# Patient Record
Sex: FEMALE | Race: WHITE | NOT HISPANIC OR LATINO | Employment: OTHER | URBAN - METROPOLITAN AREA
[De-identification: names, ages, dates, MRNs, and addresses within clinical notes are randomized per-mention and may not be internally consistent; named-entity substitution may affect disease eponyms.]

---

## 2017-01-04 ENCOUNTER — RADIANT APPOINTMENT (OUTPATIENT)
Dept: GENERAL RADIOLOGY | Facility: CLINIC | Age: 22
End: 2017-01-04
Attending: FAMILY MEDICINE
Payer: COMMERCIAL

## 2017-01-04 ENCOUNTER — OFFICE VISIT (OUTPATIENT)
Dept: URGENT CARE | Facility: URGENT CARE | Age: 22
End: 2017-01-04
Payer: COMMERCIAL

## 2017-01-04 VITALS
TEMPERATURE: 99.1 F | HEART RATE: 98 BPM | OXYGEN SATURATION: 97 % | SYSTOLIC BLOOD PRESSURE: 124 MMHG | DIASTOLIC BLOOD PRESSURE: 80 MMHG | RESPIRATION RATE: 30 BRPM

## 2017-01-04 DIAGNOSIS — R06.1 STRIDOR: ICD-10-CM

## 2017-01-04 DIAGNOSIS — J05.11 ACUTE EPIGLOTTITIS WITH AIRWAY OBSTRUCTION: ICD-10-CM

## 2017-01-04 DIAGNOSIS — R06.1 STRIDOR: Primary | ICD-10-CM

## 2017-01-04 PROCEDURE — 71020 XR CHEST 2 VW: CPT

## 2017-01-04 PROCEDURE — 94640 AIRWAY INHALATION TREATMENT: CPT | Performed by: FAMILY MEDICINE

## 2017-01-04 PROCEDURE — 70360 X-RAY EXAM OF NECK: CPT

## 2017-01-04 PROCEDURE — 99203 OFFICE O/P NEW LOW 30 MIN: CPT | Mod: 25 | Performed by: FAMILY MEDICINE

## 2017-01-04 PROCEDURE — 96372 THER/PROPH/DIAG INJ SC/IM: CPT | Mod: 59 | Performed by: FAMILY MEDICINE

## 2017-01-04 RX ORDER — IPRATROPIUM BROMIDE AND ALBUTEROL SULFATE 2.5; .5 MG/3ML; MG/3ML
1 SOLUTION RESPIRATORY (INHALATION) ONCE
Qty: 3 ML | Refills: 0
Start: 2017-01-04 | End: 2019-03-20

## 2017-01-04 RX ORDER — METHYLPREDNISOLONE SODIUM SUCCINATE 125 MG/2ML
125 INJECTION, POWDER, LYOPHILIZED, FOR SOLUTION INTRAMUSCULAR; INTRAVENOUS ONCE
Qty: 2 ML | Refills: 0 | OUTPATIENT
Start: 2017-01-04 | End: 2017-01-04

## 2017-01-05 NOTE — NURSING NOTE
Chief Complaint   Patient presents with     Urgent Care     URI     sick with fever on Monday, coughing and congestion. Started having some trouble breathing about 7:30.        Initial /80 mmHg  Pulse 98  Temp(Src) 99.1  F (37.3  C) (Oral)  Resp 30  SpO2 97%  LMP 12/28/2016  Breastfeeding? No There is no height or weight on file to calculate BMI.  BP completed using cuff size: regular

## 2017-01-05 NOTE — PROGRESS NOTES
SUBJECTIVE:  Chief Complaint   Patient presents with     Urgent Care     URI     sick with fever on Monday, coughing and congestion. Started having some trouble breathing about 7:30.      Joe Kan is a 21 year old female who presents to the clinic today with viral URI for 2 days and a chief complaint of a stridor  with shortness of breath. for 2 hours(s).  Patient denies central chest pain., pleuritic chest pain and wheezing.  Symptoms started after taking a bath 2 hours ago     The patient's symptoms are severe and not changing over the course of time  She has retractions at the neck with attempting to inhale.  Associated symptoms include none.     Patient has been using nothing  to improve symptoms.    No past medical history on file.    ALLERGIES:  Amoxicillin; Penicillins; and Tylenol w/codeine      No current outpatient prescriptions on file prior to visit.  No current facility-administered medications on file prior to visit.    Social History   Substance Use Topics     Smoking status: Current Every Day Smoker     Smokeless tobacco: Never Used     Alcohol Use: Not on file       No family history on file.      ROS  INTEGUMENTARY/SKIN: NEGATIVE for worrisome rashes, moles or lesions  EYES: NEGATIVE for vision changes or irritation  GI: NEGATIVE for nausea, abdominal pain, heartburn, or change in bowel habits    OBJECTIVE:  /80 mmHg  Pulse 98  Temp(Src) 99.1  F (37.3  C) (Oral)  Resp 30  SpO2 97%  LMP 12/28/2016  Breastfeeding? No  GENERAL APPEARANCE: alert, severe distress and significant effort to inhale  EYES: EOMI,  PERRL, conjunctiva clear  Mouth- no swelling of tongue or lips or face  NECK: supple, nontender, no lymphadenopathy-  Audible stridor  Retractions in supraclavicular area with inspiration  RESP: lungs clear to auscultation - no rales, rhonchi or wheezes  CV: regular rates and rhythm, normal S1 S2, no murmur noted  NEURO: Normal strength and tone, sensory exam grossly normal,   normal speech and mentation  SKIN: no suspicious lesions or rashes    Chest X-ray:  No infiltrate  Lateral neck x-ray-  Swelling of the epiglottis.  Narrow airway at level of the trachea    ASSESSMENT:     Stridor     - ipratropium - albuterol 0.5 mg/2.5 mg/3 mL (DUONEB) 0.5-2.5 (3) MG/3ML neb solution; Take 1 vial (3 mLs) by nebulization once for 1 dose  - METHYLPREDNISOLONE INJ / 125MG  - methylPREDNISolone sodium succinate (SOLU-MEDROL) 125 mg/2 mL injection; Inject 2 mLs (125 mg) into the muscle once for 1 dose  - DiphenhydrAMINE HCl, Sleep, 25 MG CAPS; Take 25 mg by mouth once for 1 dose  - XR Neck Soft Tissue; Future  - XR Chest 2 Views; Future  - INJECTION INTRAMUSCULAR OR SUB-Q    After treatment she demonstrated no improvement in her respiratory effort    Acute epiglottitis with airway obstruction      since she did not respond to initial therapy for allergic type airway obstruction, transport to ER was arranged-  Initially arranged to go to [facility], but when ambulance arrived they felt she should go to [place] since she has [facility]

## 2018-09-18 ENCOUNTER — RECORDS - HEALTHEAST (OUTPATIENT)
Dept: LAB | Facility: CLINIC | Age: 23
End: 2018-09-18

## 2018-09-18 LAB — TSH SERPL DL<=0.005 MIU/L-ACNC: 1.02 UIU/ML (ref 0.3–5)

## 2018-09-19 ENCOUNTER — RECORDS - HEALTHEAST (OUTPATIENT)
Dept: ADMINISTRATIVE | Facility: OTHER | Age: 23
End: 2018-09-19

## 2018-09-21 LAB
BKR LAB AP ABNORMAL BLEEDING: NO
BKR LAB AP BIRTH CONTROL/HORMONES: NORMAL
BKR LAB AP CERVICAL APPEARANCE: NORMAL
BKR LAB AP GYN ADEQUACY: NORMAL
BKR LAB AP GYN INTERPRETATION: NORMAL
BKR LAB AP HPV REFLEX: NORMAL
BKR LAB AP LMP: NORMAL
BKR LAB AP PATIENT STATUS: NORMAL
BKR LAB AP PREVIOUS ABNORMAL: NORMAL
BKR LAB AP PREVIOUS NORMAL: 2014
HIGH RISK?: NO
PATH REPORT.COMMENTS IMP SPEC: NORMAL
RESULT FLAG (HE HISTORICAL CONVERSION): NORMAL

## 2019-03-20 ENCOUNTER — OFFICE VISIT (OUTPATIENT)
Dept: URGENT CARE | Facility: URGENT CARE | Age: 24
End: 2019-03-20

## 2019-03-20 VITALS
BODY MASS INDEX: 18.33 KG/M2 | WEIGHT: 110 LBS | HEART RATE: 84 BPM | RESPIRATION RATE: 15 BRPM | TEMPERATURE: 97.8 F | SYSTOLIC BLOOD PRESSURE: 100 MMHG | HEIGHT: 65 IN | DIASTOLIC BLOOD PRESSURE: 68 MMHG

## 2019-03-20 DIAGNOSIS — S05.01XA CORNEAL ABRASION, RIGHT, INITIAL ENCOUNTER: ICD-10-CM

## 2019-03-20 DIAGNOSIS — T15.91XA FOREIGN BODY OF RIGHT EYE, INITIAL ENCOUNTER: Primary | ICD-10-CM

## 2019-03-20 PROCEDURE — 99213 OFFICE O/P EST LOW 20 MIN: CPT | Performed by: NURSE PRACTITIONER

## 2019-03-20 ASSESSMENT — ENCOUNTER SYMPTOMS
NEUROLOGICAL NEGATIVE: 1
CONSTITUTIONAL NEGATIVE: 1
EYE DISCHARGE: 0
EYE REDNESS: 1
PHOTOPHOBIA: 0
EYE PAIN: 1
CARDIOVASCULAR NEGATIVE: 1
RESPIRATORY NEGATIVE: 1
DOUBLE VISION: 0
BLURRED VISION: 0

## 2019-03-20 ASSESSMENT — MIFFLIN-ST. JEOR: SCORE: 1239.84

## 2019-03-21 NOTE — PATIENT INSTRUCTIONS
Patient Education     Corneal Abrasion    You have received a scratch or scrape (abrasion) to your cornea. The cornea is the clear part in the front of the eye. This sensitive area is very painful when injured. You may make tears frequently, and your vision may be blurry until the injury heals. You may be sensitive to light.  This part of the body heals quickly. You can expect the pain to go away within 24 to 48 hours. If the abrasion is large or deep, your doctor may apply an eye patch, although this is not always done. An antibiotic ointment or eye drops may also be used to prevent infection.  Numbing drops may be used to relieve the pain temporarily so that your eyes can be examined. But these drops can t be prescribed for home use because that would prevent healing and lead to more serious problems. Also, if you can t feel your eye, there is a chance of accidentally injuring it further without knowing it.  Home care    A cold pack may be applied over the eye (or eye patch) for 20 minutes at a time, to reduce pain. To make a cold pack, put ice cubes in a plastic bag that seals at the top. Wrap the bag in a clean, thin towel or cloth.    You may use acetaminophen or ibuprofen to control pain, unless another pain medicine was prescribed. Note: If you have chronic liver or kidney disease or have ever had a stomach ulcer or GI bleeding, talk with your doctor before using these medicines.    Rest your eyes and don t read until symptoms are gone.    If you use contact lenses, don t wear them until all symptoms are gone.    If your vision is affected by the corneal abrasion or if an eye patch was applied, don t drive a motor vehicle or operate machinery until all symptoms are gone. You may have trouble judging distances using only one eye.    If your eyes are sensitive to light, try wearing sunglasses, or stay indoors until symptoms go away.  Follow-up care  Follow up with your healthcare provider, or as  advised.    If no patch was put on your eye and the pain continues for more than 48 hours, you should have another exam. Contact your healthcare provider to arrange this.    If your eye was patched and you were asked to remove the patch yourself, see your healthcare provider. Contact your healthcare provider if you still have pain after the patch is removed.    If you were given a return appointment for patch removal and re-examination, be sure to keep the appointment. Leaving the patch in place longer than advised could be harmful.  When to seek medical advice  Call your healthcare provider right away if any of these occur.    Increasing eye pain or pain that does not improve after 24 hours    Discharge from the eye    Increasing redness of the eye or swelling of the eyelids    Worsening vision    Symptoms get worse after the abrasion has healed  Date Last Reviewed: 7/1/2017 2000-2018 [employer]. [address] [place] [id number]. All rights reserved. This information is not intended as a substitute for professional medical care. Always follow your healthcare professional's instructions.

## 2019-03-21 NOTE — PROGRESS NOTES
"HPI  Joe Kan 24 year old with foreign body of the right eye. She believes it is dust. Tried to flush the eye but symptoms worsened. Pain w/o vision changes or discharge.     No past medical history on file.   There is no problem list on file for this patient.    Allergies   Allergen Reactions     Amoxicillin      Penicillins      Tylenol W/Codeine [Acetaminophen-Codeine]      Current Outpatient Medications   Medication     etonogestrel (IMPLANON/NEXPLANON) 68 MG IMPL     ipratropium - albuterol 0.5 mg/2.5 mg/3 mL (DUONEB) 0.5-2.5 (3) MG/3ML neb solution     No current facility-administered medications for this visit.          Review of Systems   Constitutional: Negative.    HENT: Negative.    Eyes: Positive for pain and redness. Negative for blurred vision, double vision, photophobia and discharge.   Respiratory: Negative.    Cardiovascular: Negative.    Neurological: Negative.          Physical Exam   Constitutional: She is oriented to person, place, and time. She appears well-developed and well-nourished. She appears distressed.   /68   Pulse 84   Temp 97.8  F (36.6  C) (Oral)   Resp 15   Ht 1.651 m (5' 5\")   Wt 49.9 kg (110 lb)   LMP 01/20/2019   BMI 18.30 kg/m       HENT:   Head: Normocephalic.   Eyes: EOM are normal. Pupils are equal, round, and reactive to light. Foreign body present in the right eye. Right conjunctiva is injected.   Slit lamp exam:       The right eye shows fluorescein uptake.       Lid enverted and a fabric fiber removed (might be a pet hair). Fluorescein stain shows 2 corneal abrasions on the right eye one at 7:00 and 11:00.   Cardiovascular: Normal rate.   Pulmonary/Chest: Effort normal.   Neurological: She is alert and oriented to person, place, and time.   Skin: Skin is warm and dry. No rash noted. No erythema.   Nursing note and vitals reviewed.    Assessment:  1. Foreign body of right eye, initial encounter    2. Corneal abrasion, right, initial encounter  "       Plan:  Orders Placed This Encounter     etonogestrel (IMPLANON/NEXPLANON) 68 MG IMPL   EES ointment instilled in the eye and patient given the remainder of the tube  And advised to place  it in the eye q 3 hours while awake for 3-4 days  Instructions regarding self-care of patent/child reviewed.   Written instructions provided in after visit summary and reviewed.  Patient instructed to see primary care provider for new or persistent symptoms.   Red flag symptoms reviewed and patient has been instructed to seek emergent   Care at the closest emergency room for evaluation and treatment.   Please contact pharmacy for medication questions.  Patient instructed to take medications as directed on package.      Venus Grossman, APRN, CNP

## 2020-02-29 ENCOUNTER — OFFICE VISIT (OUTPATIENT)
Dept: URGENT CARE | Facility: URGENT CARE | Age: 25
End: 2020-02-29
Payer: COMMERCIAL

## 2020-02-29 VITALS
DIASTOLIC BLOOD PRESSURE: 69 MMHG | HEIGHT: 65 IN | SYSTOLIC BLOOD PRESSURE: 109 MMHG | WEIGHT: 110 LBS | OXYGEN SATURATION: 99 % | HEART RATE: 69 BPM | TEMPERATURE: 98 F | BODY MASS INDEX: 18.33 KG/M2

## 2020-02-29 DIAGNOSIS — S05.01XA ABRASION OF RIGHT CORNEA, INITIAL ENCOUNTER: Primary | ICD-10-CM

## 2020-02-29 PROCEDURE — 99213 OFFICE O/P EST LOW 20 MIN: CPT | Performed by: FAMILY MEDICINE

## 2020-02-29 ASSESSMENT — MIFFLIN-ST. JEOR: SCORE: 1234.84

## 2020-03-01 NOTE — PROGRESS NOTES
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    Objective: Initially unable to hold eye open.  I put in apathetic and she was able to open it and I could not do a good exam.  I looked carefully for a foreign body in particular a small here but did not find 1.  I flipped the eyelid over and could not find it.  I then put in fluorescein dye and with the black light at about 10:00 there was an irregular patch.  Looking more closely at it I could not see any foreign body.    Assessment and plan: Corneal abrasion.  I do not see anything still in the eye.  I recommend she go home, take Tylenol and ibuprofen and if it is a simple abrasion it should be better in the morning.  If not she probably needs to be seen in the emergency room where they can use the slit lamp to see it better.

## 2020-03-10 ENCOUNTER — HEALTH MAINTENANCE LETTER (OUTPATIENT)
Age: 25
End: 2020-03-10

## 2020-12-27 ENCOUNTER — HEALTH MAINTENANCE LETTER (OUTPATIENT)
Age: 25
End: 2020-12-27

## 2021-04-24 ENCOUNTER — HEALTH MAINTENANCE LETTER (OUTPATIENT)
Age: 26
End: 2021-04-24

## 2021-05-31 ENCOUNTER — RECORDS - HEALTHEAST (OUTPATIENT)
Dept: ADMINISTRATIVE | Facility: CLINIC | Age: 26
End: 2021-05-31

## 2021-06-01 ENCOUNTER — RECORDS - HEALTHEAST (OUTPATIENT)
Dept: ADMINISTRATIVE | Facility: CLINIC | Age: 26
End: 2021-06-01

## 2021-06-02 ENCOUNTER — RECORDS - HEALTHEAST (OUTPATIENT)
Dept: ADMINISTRATIVE | Facility: CLINIC | Age: 26
End: 2021-06-02

## 2021-10-09 ENCOUNTER — HEALTH MAINTENANCE LETTER (OUTPATIENT)
Age: 26
End: 2021-10-09

## 2022-01-24 ENCOUNTER — LAB REQUISITION (OUTPATIENT)
Dept: LAB | Facility: CLINIC | Age: 27
End: 2022-01-24

## 2022-01-24 DIAGNOSIS — Z01.419 ENCOUNTER FOR GYNECOLOGICAL EXAMINATION (GENERAL) (ROUTINE) WITHOUT ABNORMAL FINDINGS: ICD-10-CM

## 2022-01-24 PROCEDURE — G0123 SCREEN CERV/VAG THIN LAYER: HCPCS | Performed by: FAMILY MEDICINE

## 2022-01-26 LAB
BKR LAB AP GYN ADEQUACY: NORMAL
BKR LAB AP GYN INTERPRETATION: NORMAL
BKR LAB AP HPV REFLEX: NORMAL
BKR LAB AP LMP: NORMAL
BKR LAB AP PREVIOUS ABNORMAL: NORMAL
PATH REPORT.COMMENTS IMP SPEC: NORMAL
PATH REPORT.COMMENTS IMP SPEC: NORMAL
PATH REPORT.RELEVANT HX SPEC: NORMAL

## 2022-05-16 ENCOUNTER — HEALTH MAINTENANCE LETTER (OUTPATIENT)
Age: 27
End: 2022-05-16

## 2022-09-11 ENCOUNTER — HEALTH MAINTENANCE LETTER (OUTPATIENT)
Age: 27
End: 2022-09-11

## 2023-06-03 ENCOUNTER — HEALTH MAINTENANCE LETTER (OUTPATIENT)
Age: 28
End: 2023-06-03

## 2023-07-17 ENCOUNTER — LAB REQUISITION (OUTPATIENT)
Dept: LAB | Facility: CLINIC | Age: 28
End: 2023-07-17
Payer: MEDICAID

## 2023-07-17 ENCOUNTER — PATIENT OUTREACH (OUTPATIENT)
Dept: CARE COORDINATION | Facility: CLINIC | Age: 28
End: 2023-07-17

## 2023-07-17 DIAGNOSIS — Q62.8 OTHER CONGENITAL MALFORMATIONS OF URETER: ICD-10-CM

## 2023-07-17 PROCEDURE — 87086 URINE CULTURE/COLONY COUNT: CPT | Mod: ORL | Performed by: FAMILY MEDICINE

## 2023-07-19 LAB — BACTERIA UR CULT: NO GROWTH

## 2023-08-21 ENCOUNTER — PATIENT OUTREACH (OUTPATIENT)
Dept: CARE COORDINATION | Facility: CLINIC | Age: 28
End: 2023-08-21
Payer: MEDICAID

## 2023-08-21 ASSESSMENT — ACTIVITIES OF DAILY LIVING (ADL): DEPENDENT_IADLS:: INDEPENDENT

## 2023-08-21 NOTE — PROGRESS NOTES
Clinic Care Coordination Contact  [facility] [place]/Voicemail       Clinical Data: Care Coordinator Outreach  Outreach attempted x 1.  Left message on patient's voicemail with call back information and requested return call.  Plan:  Care Coordinator will try to reach patient again in 1 month.    Shell Holguin [facility]  Social Work Care Coordinator - [facility]  Care Coordination  [email or web address]  Cell Phone: [phone]  Gender pronouns: she/her  Employed by [employer]

## 2023-08-21 NOTE — PROGRESS NOTES
Clinic Care Coordination Contact  Clinic Care Coordination Contact  OUTREACH    Referral Information:  Referral Source: PCP    Primary Diagnosis: Psychosocial    Chief Complaint   Patient presents with    Clinic Care Coordination - Face To Face        Universal Utilization:   Clinic Utilization: [facility]   Difficulty keeping appointments:: No  Compliance Concerns: No  No-Show Concerns: No  No PCP office visit in Past Year: No  Utilization      Hospital Admissions  0             ED Visits  0             No Show Count (past year)  0                    Current as of: 8/19/2023  2:09 PM                Clinical Concerns:  Current Medical Concerns:  n/a    Current Behavioral Concerns:     LEWIS MARROQUIN met with the pt and her mom in clinic. Pt was asking for assistance with getting mental health resources/supports set up. Pt was open to ideas of different  therapists. LEWIS CC and pt discussed [employer] and her ability to look at what the provider may look like and read about their background to see if there is someone she felt may be more appropriate for her and her needs.     Noted there was mention of need for a psych evaluation to be done as well, first step is getting established with therapists.       Education Provided to patient: LOPEZ SAUCEDO called and spoke with patient; introduced self, discussed role of Care Coordination, and explained reason for call.     Pain  Pain (GOAL):: No  Health Maintenance Reviewed: Up to date  Clinical Pathway: None    Medication Management:  Medication review status: did not discuss     Functional Status:  Dependent ADLs:: Independent  Dependent IADLs:: Independent  Bed or wheelchair confined:: No  Mobility Status: Independent  Fallen 2 or more times in the past year?: No  Any fall with injury in the past year?: No    Living Situation:  Current living arrangement:: I live in a private home with family  Type of residence:: Apartment    Lifestyle & Psychosocial  Needs:    Social Determinants of Health     Tobacco Use: High Risk (7/8/2021)    Patient History     Smoking Tobacco Use: Every Day     Smokeless Tobacco Use: Never     Passive Exposure: Not on file   Alcohol Use: Not on file   Financial Resource Strain: Not on file   Food Insecurity: Not on file   Transportation Needs: Not on file   Physical Activity: Not on file   Stress: Not on file   Social Connections: Not on file   Intimate Partner Violence: Not on file   Depression: Not on file   Housing Stability: Not on file     Diet:: Regular  Inadequate nutrition (GOAL):: No  Tube Feeding: No  Inadequate activity/exercise (GOAL):: No  Significant changes in sleep pattern (GOAL): No  Transportation means:: Accessible car     Temple or spiritual beliefs that impact treatment:: No  Mental health DX:: Yes  Mental health management concern (GOAL):: Yes  Chemical Dependency Status: Current Concern  Informal Support system:: Family, Children        Resources and Interventions:  Current Resources:      Community Resources: None  Supplies Currently Used at Home: None  Equipment Currently Used at Home: none  Employment Status: employed full-time         Advance Care Plan/Directive  Advanced Care Plans/Directives on file:: No  Advanced Care Plan/Directive Status: Not Applicable    Referrals Placed: Mental Health    The patient consented via Verbal consent to have contact information and resources sent via text in an unencrypted manner.     Care Plan:  Care Plan: Mental Health SW Only       Problem: Mental Health Symptoms Need Improvement       Goal: Improve management of mental health symptoms and establish with mental health/psychosocial supports       Start Date: 7/17/2023 Expected End Date: 11/17/2023    Note:     Barriers: availability and eligibility   Strengths: strong family support   Patient expressed understanding of goal: yes   Action steps to achieve this goal:  1. I will review the therapists and mental health  supports shared with me via text from [facility].   2. I will connect with a therapist/provider, establish services and follow up on recommendations.   3. I will contact the  CC with any questions or concerns.                                 Patient/Caregiver understanding: Patient verbalized understanding, engaged in AIDET communication during patient encounter.      Outreach Frequency: monthly      Plan: Pt to follow up on therapists resources and establish with a provider.  CC will follow up with the pt in 1 month. Pt to contact the [facility] with any questions or concerns.     Shell Holguin [facility]  Social Work Care Coordinator - [facility]  Care Coordination  [email or web address]  Cell Phone: [phone]  Gender pronouns: she/her  Employed by [employer]

## 2023-09-16 ENCOUNTER — HOSPITAL ENCOUNTER (EMERGENCY)
Facility: HOSPITAL | Age: 28
Discharge: HOME | End: 2023-09-16
Attending: STUDENT IN AN ORGANIZED HEALTH CARE EDUCATION/TRAINING PROGRAM | Admitting: STUDENT IN AN ORGANIZED HEALTH CARE EDUCATION/TRAINING PROGRAM
Payer: COMMERCIAL

## 2023-09-16 ENCOUNTER — APPOINTMENT (OUTPATIENT)
Dept: CT IMAGING | Facility: HOSPITAL | Age: 28
End: 2023-09-16
Attending: STUDENT IN AN ORGANIZED HEALTH CARE EDUCATION/TRAINING PROGRAM
Payer: COMMERCIAL

## 2023-09-16 VITALS
HEART RATE: 119 BPM | DIASTOLIC BLOOD PRESSURE: 60 MMHG | TEMPERATURE: 98.1 F | RESPIRATION RATE: 38 BRPM | BODY MASS INDEX: 22.7 KG/M2 | OXYGEN SATURATION: 97 % | SYSTOLIC BLOOD PRESSURE: 101 MMHG | WEIGHT: 136.24 LBS | HEIGHT: 65 IN

## 2023-09-16 DIAGNOSIS — F32.A DEPRESSION, UNSPECIFIED DEPRESSION TYPE: ICD-10-CM

## 2023-09-16 DIAGNOSIS — E86.0 DEHYDRATION: ICD-10-CM

## 2023-09-16 DIAGNOSIS — R11.2 NAUSEA AND VOMITING, UNSPECIFIED VOMITING TYPE: ICD-10-CM

## 2023-09-16 LAB
ALBUMIN SERPL BCG-MCNC: 5.3 G/DL (ref 3.5–5.2)
ALBUMIN UR-MCNC: 100 MG/DL
ALP SERPL-CCNC: 61 U/L (ref 35–104)
ALT SERPL W P-5'-P-CCNC: 49 U/L (ref 0–50)
ANION GAP SERPL CALCULATED.3IONS-SCNC: 27 MMOL/L (ref 7–15)
APPEARANCE UR: CLEAR
AST SERPL W P-5'-P-CCNC: 100 U/L (ref 0–45)
BASOPHILS # BLD AUTO: 0 10E3/UL (ref 0–0.2)
BASOPHILS NFR BLD AUTO: 1 %
BILIRUB DIRECT SERPL-MCNC: <0.2 MG/DL (ref 0–0.3)
BILIRUB SERPL-MCNC: 0.5 MG/DL
BILIRUB UR QL STRIP: NEGATIVE
BUN SERPL-MCNC: 9.6 MG/DL (ref 6–20)
CALCIUM SERPL-MCNC: 9.6 MG/DL (ref 8.6–10)
CHLORIDE SERPL-SCNC: 97 MMOL/L (ref 98–107)
COLOR UR AUTO: ABNORMAL
CREAT SERPL-MCNC: 0.57 MG/DL (ref 0.51–0.95)
DEPRECATED HCO3 PLAS-SCNC: 15 MMOL/L (ref 22–29)
EGFRCR SERPLBLD CKD-EPI 2021: >90 ML/MIN/1.73M2
EOSINOPHIL # BLD AUTO: 0 10E3/UL (ref 0–0.7)
EOSINOPHIL NFR BLD AUTO: 0 %
ERYTHROCYTE [DISTWIDTH] IN BLOOD BY AUTOMATED COUNT: 12.1 % (ref 10–15)
GLUCOSE SERPL-MCNC: 57 MG/DL (ref 70–99)
GLUCOSE UR STRIP-MCNC: NEGATIVE MG/DL
HCG UR QL: NEGATIVE
HCT VFR BLD AUTO: 38.7 % (ref 35–47)
HGB BLD-MCNC: 12.9 G/DL (ref 11.7–15.7)
HGB UR QL STRIP: NEGATIVE
HOLD SPECIMEN: NORMAL
IMM GRANULOCYTES # BLD: 0 10E3/UL
IMM GRANULOCYTES NFR BLD: 0 %
KETONES UR STRIP-MCNC: >150 MG/DL
LEUKOCYTE ESTERASE UR QL STRIP: ABNORMAL
LIPASE SERPL-CCNC: 36 U/L (ref 13–60)
LYMPHOCYTES # BLD AUTO: 1.2 10E3/UL (ref 0.8–5.3)
LYMPHOCYTES NFR BLD AUTO: 20 %
MCH RBC QN AUTO: 33.6 PG (ref 26.5–33)
MCHC RBC AUTO-ENTMCNC: 33.3 G/DL (ref 31.5–36.5)
MCV RBC AUTO: 101 FL (ref 78–100)
MONOCYTES # BLD AUTO: 0.5 10E3/UL (ref 0–1.3)
MONOCYTES NFR BLD AUTO: 8 %
MUCOUS THREADS #/AREA URNS LPF: PRESENT /LPF
NEUTROPHILS # BLD AUTO: 4.3 10E3/UL (ref 1.6–8.3)
NEUTROPHILS NFR BLD AUTO: 71 %
NITRATE UR QL: NEGATIVE
NRBC # BLD AUTO: 0 10E3/UL
NRBC BLD AUTO-RTO: 0 /100
PH UR STRIP: 5.5 [PH] (ref 5–7)
PLATELET # BLD AUTO: 217 10E3/UL (ref 150–450)
POTASSIUM SERPL-SCNC: 3.4 MMOL/L (ref 3.4–5.3)
PROT SERPL-MCNC: 8.2 G/DL (ref 6.4–8.3)
RBC # BLD AUTO: 3.84 10E6/UL (ref 3.8–5.2)
RBC URINE: 1 /HPF
SODIUM SERPL-SCNC: 139 MMOL/L (ref 136–145)
SP GR UR STRIP: 1.03 (ref 1–1.03)
SQUAMOUS EPITHELIAL: 2 /HPF
UROBILINOGEN UR STRIP-MCNC: <2 MG/DL
WBC # BLD AUTO: 6.1 10E3/UL (ref 4–11)
WBC URINE: 7 /HPF

## 2023-09-16 PROCEDURE — 96375 TX/PRO/DX INJ NEW DRUG ADDON: CPT

## 2023-09-16 PROCEDURE — 99285 EMERGENCY DEPT VISIT HI MDM: CPT | Mod: 25

## 2023-09-16 PROCEDURE — 96374 THER/PROPH/DIAG INJ IV PUSH: CPT | Mod: 59

## 2023-09-16 PROCEDURE — 85025 COMPLETE CBC W/AUTO DIFF WBC: CPT | Performed by: STUDENT IN AN ORGANIZED HEALTH CARE EDUCATION/TRAINING PROGRAM

## 2023-09-16 PROCEDURE — 83690 ASSAY OF LIPASE: CPT | Performed by: STUDENT IN AN ORGANIZED HEALTH CARE EDUCATION/TRAINING PROGRAM

## 2023-09-16 PROCEDURE — 96361 HYDRATE IV INFUSION ADD-ON: CPT

## 2023-09-16 PROCEDURE — 250N000011 HC RX IP 250 OP 636: Mod: JZ | Performed by: STUDENT IN AN ORGANIZED HEALTH CARE EDUCATION/TRAINING PROGRAM

## 2023-09-16 PROCEDURE — 250N000013 HC RX MED GY IP 250 OP 250 PS 637: Performed by: EMERGENCY MEDICINE

## 2023-09-16 PROCEDURE — 81025 URINE PREGNANCY TEST: CPT | Performed by: STUDENT IN AN ORGANIZED HEALTH CARE EDUCATION/TRAINING PROGRAM

## 2023-09-16 PROCEDURE — 82248 BILIRUBIN DIRECT: CPT | Performed by: STUDENT IN AN ORGANIZED HEALTH CARE EDUCATION/TRAINING PROGRAM

## 2023-09-16 PROCEDURE — 81001 URINALYSIS AUTO W/SCOPE: CPT | Performed by: STUDENT IN AN ORGANIZED HEALTH CARE EDUCATION/TRAINING PROGRAM

## 2023-09-16 PROCEDURE — 36415 COLL VENOUS BLD VENIPUNCTURE: CPT | Performed by: EMERGENCY MEDICINE

## 2023-09-16 PROCEDURE — 250N000011 HC RX IP 250 OP 636: Performed by: EMERGENCY MEDICINE

## 2023-09-16 PROCEDURE — 74177 CT ABD & PELVIS W/CONTRAST: CPT

## 2023-09-16 PROCEDURE — 258N000003 HC RX IP 258 OP 636: Performed by: STUDENT IN AN ORGANIZED HEALTH CARE EDUCATION/TRAINING PROGRAM

## 2023-09-16 RX ORDER — ONDANSETRON 4 MG/1
4 TABLET, ORALLY DISINTEGRATING ORAL EVERY 8 HOURS PRN
Qty: 21 TABLET | Refills: 0 | Status: SHIPPED | OUTPATIENT
Start: 2023-09-16 | End: 2023-09-23

## 2023-09-16 RX ORDER — DIPHENHYDRAMINE HYDROCHLORIDE 50 MG/ML
25 INJECTION INTRAMUSCULAR; INTRAVENOUS ONCE
Status: COMPLETED | OUTPATIENT
Start: 2023-09-16 | End: 2023-09-16

## 2023-09-16 RX ORDER — DIAZEPAM 5 MG
5 TABLET ORAL ONCE
Status: COMPLETED | OUTPATIENT
Start: 2023-09-16 | End: 2023-09-16

## 2023-09-16 RX ORDER — DROPERIDOL 2.5 MG/ML
1.25 INJECTION, SOLUTION INTRAMUSCULAR; INTRAVENOUS ONCE
Status: COMPLETED | OUTPATIENT
Start: 2023-09-16 | End: 2023-09-16

## 2023-09-16 RX ORDER — IOPAMIDOL 755 MG/ML
90 INJECTION, SOLUTION INTRAVASCULAR ONCE
Status: COMPLETED | OUTPATIENT
Start: 2023-09-16 | End: 2023-09-16

## 2023-09-16 RX ADMIN — SODIUM CHLORIDE, POTASSIUM CHLORIDE, SODIUM LACTATE AND CALCIUM CHLORIDE 1000 ML: 600; 310; 30; 20 INJECTION, SOLUTION INTRAVENOUS at 12:15

## 2023-09-16 RX ADMIN — IOPAMIDOL 90 ML: 755 INJECTION, SOLUTION INTRAVENOUS at 14:33

## 2023-09-16 RX ADMIN — DIPHENHYDRAMINE HYDROCHLORIDE 25 MG: 50 INJECTION, SOLUTION INTRAMUSCULAR; INTRAVENOUS at 12:14

## 2023-09-16 RX ADMIN — DROPERIDOL 1.25 MG: 2.5 INJECTION, SOLUTION INTRAMUSCULAR; INTRAVENOUS at 12:14

## 2023-09-16 RX ADMIN — DIAZEPAM 5 MG: 5 TABLET ORAL at 16:18

## 2023-09-16 ASSESSMENT — ACTIVITIES OF DAILY LIVING (ADL)
ADLS_ACUITY_SCORE: 35

## 2023-09-16 NOTE — ED NOTES
Progress Note    The patient was signed out to me by Dr. Chandra Pierce at 2:32 PM.    Brief HPI: Patient has had intermittent episodes of nausea for the last several months. Last several days has been unable to eat due to this and notes having left flank pain which she has a history of. Of note she has had passive suicidal thoughts which is not new.     ED Course as of 09/16/23 1508   Sat Sep 16, 2023   1507 Signout:  29 yo F with months of nausea. CT scan and PO challenge pending. chronic depression without active SI and good follow  up. she has dispo set up.    CT:  1.  No significant findings on CT abdomen and pelvis to explain the patient's pain.  2.  Moderate diffuse hepatic steatosis.       3:08 PM I spoke to patient about plan for discharge.     4:15 PM he is able to keep food and drink down.  She is somewhat tremulous.  I asked her mother to leave and we discussed her symptoms.  She states that she does normally drink little bit of alcohol but she has been trying to quit.  She stopped drinking 2 days ago and had 1 shot yesterday to help her symptoms.  I told her that a lot of her symptoms today are likely due to the withdrawal.  I offered a dose of Valium before she left and she would like to do this.  I let her know that if things are getting worse at home she needs to come back for further treatment.  She understands.    Final diagnoses:   Nausea and vomiting, unspecified vomiting type   Dehydration   Depression, unspecified depression type   Alcohol withdrawal      Ander Dexter MD  Emergency Medicine  [facility]      Ander Dexter MD  09/16/23 [id number]

## 2023-09-16 NOTE — ED NOTES
"She has had thoughts of harming herself in recent days due to her sense \"I cannot do anything right.\" She does not have a plan. She does take medications. She has been talking to her provider at [facility] about getting a mental health provider. 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  "

## 2023-09-16 NOTE — Clinical Note
Joe Kan was seen and treated in our emergency department on 9/16/2023.  She may return to work on 09/19/2023.       If you have any questions or concerns, please don't hesitate to call.      Chandra Pierce MD

## 2023-09-16 NOTE — DISCHARGE INSTRUCTIONS
The cause of your symptoms is still unknown, as your work-up was reassuring.  We have provided you with Zofran to help ease your nausea in the coming days.  You should follow-up with your primary care provider on Monday to discuss your ongoing nausea in addition to discussing your mental health concerns and possibly restarting your medications.    Please return to the emergency department if you are unable to tolerate any fluids despite the Zofran.  Return if you develop worsening suicidal thoughts, plans to harm yourself or others.

## 2023-09-16 NOTE — ED PROVIDER NOTES
"EMERGENCY DEPARTMENT ENCOUNTER      NAME: Joe Kan  AGE: 28 year old female  YOB: 1995  MRN: [id number]  EVALUATION DATE & TIME: 9/16/2023 10:08 AM    PCP: Anny Juares    ED PROVIDER: Chandra Pierce MD      Chief Complaint   Patient presents with    Nausea       FINAL IMPRESSION:  1. Nausea and vomiting, unspecified vomiting type    2. Dehydration    3. Depression, unspecified depression type        ED COURSE & MEDICAL DECISION MAKING:    Pertinent Labs & Imaging studies reviewed. (See chart for details)  28 year old female presents to the Emergency Department for evaluation of nausea/vomiting, depression, dehydration    10:55 AM I met with the patient, obtained history, performed an initial exam, and discussed options and plan for diagnostics and treatment here in the ED.  12:41 PM Reevaluated the patient. Updated on results and plan.    ED Course as of 09/16/23 1444   Sat Sep 16, 2023   1113 Patient is a 28-year-old female who presents the emergency department with 5 months of nausea and inability to tolerate food.  She states it started initially as a \"gag\" when she would try to eat, and now anytime she thinks about food that is what occurs, it is left her nauseous most of the time.  Today she felt extraordinarily hungry and upset that she had difficulty eating, so came to the ER.  She endorses intermittent left-sided flank pain because of a \"kidney disease\" but is unclear what she is referring to on chart review.  She has nonfocal abdominal tenderness, but denies lower abdominal pain.  No  symptoms.  No fevers.  She also endorses feelings of sadness and hopelessness, and has considered suicide, but does not have a plan.  We discussed safety planning, and she agrees that she has good family support structure and can go to them, denies having a gun in the house, and endorses a plan to follow-up with her primary on Monday to restart antianxiety medications that she had taken " herself off of years ago.  I think overall she is low risk, and does not warrant one-to-one or DEC involvement.   1159 No evidence of infection in the urine.  No pancreatitis.  Patient does have an elevated AST at 100 relative to a normal ALT of 49 without hyperbilirubinemia.  Unclear etiology of this.  No electrolyte abnormalities or kidney injury, but does have an elevated anion gap acidosis, suspect starvation ketosis generation, as her urine is also very concentrated with ketones.  No leukocytosis or anemia.   1244 Discussed with patient's mother (with consent from the patient) in the patient's room regarding plan for outpatient management, and safety planning, with everyone in agreement.  Her mother was able to share that the condition she had with her left kidney was called vesicoureteral reflux, and she has been lost to follow-up.  Given this and her continued symptoms, will obtain CT scan to evaluate for potential pathology with the left kidney causing her pain and nausea.  Patient will also be p.o. challenged after fluids complete.       Medical Decision Making    History:  Supplemental history from: Documented in chart, if applicable  External Record(s) reviewed: Documented in chart, if applicable.    Work Up:  Chart documentation includes differential considered and any EKGs or imaging independently interpreted by provider, where specified.  In additional to work up documented, I considered the following work up: Documented in chart, if applicable.    External consultation:  Discussion of management with another provider: Documented in chart, if applicable    Complicating factors:  Care impacted by chronic illness: Mental Health  Care affected by social determinants of health: N/A    Disposition considerations: Admission considered. Patient was signed out to the oncoming physician, disposition pending.      At the conclusion of the encounter I discussed the results of all of the tests and the disposition.  "The questions were answered. The patient or family acknowledged understanding and was agreeable with the care plan.     0 minutes of critical care time     MEDICATIONS GIVEN IN THE EMERGENCY:  Medications   droPERidol (INAPSINE) injection 1.25 mg (1.25 mg Intravenous $Given 9/16/23 1214)   diphenhydrAMINE (BENADRYL) injection 25 mg (25 mg Intravenous $Given 9/16/23 1214)   lactated ringers BOLUS 1,000 mL (0 mLs Intravenous Stopped 9/16/23 1436)   iopamidol (ISOVUE-370) solution 90 mL (90 mLs Intravenous $Given 9/16/23 1433)       NEW PRESCRIPTIONS STARTED AT TODAY'S ER VISIT  New Prescriptions    ONDANSETRON (ZOFRAN ODT) 4 MG ODT TAB    Take 1 tablet (4 mg) by mouth every 8 hours as needed for nausea     =================================================================    HPI    Patient information was obtained from: patient    Use of : N/A    Joe Kan is a 28 year old female with a pertinent history of anxiety and depression who presents to this ED by walk in for evaluation of nausea. Patient reports intermittent episodes of nausea and gagging for the last several months which has been slowly progressing. Occasionally the thought of eating or drinking will make her gag. The last several days has been unable to eat or drink without vomiting. She also reports a history of \"kidney issues\" since she was a kid and has occasional left flank pain which is currently bothering her as well. States that she is very hungry and thirsty and believes this is contributing to the flank pain. Denies any abdominal pain. No dysuria, hematuria, or other urinary symptoms.  had vasectomy and denies chance of pregnancy. No history of abdominal surgeries. Also denies any recent fevers or illnesses.    Patient also reported having passive suicidal thoughts which she reports is not new for her. Reports that she is on hydroxyzine PRN for anxiety but does not take any daily medications for anxiety or depression. Denies " "any active plan and says she would never commit suicide. She does not have any guns at home. Reports that she has good family support and is more concerned today about her nausea. She has a PCP who she can have close follow up with. She denies any other concerns.      PAST MEDICAL HISTORY:  History reviewed. No pertinent past medical history.    PAST SURGICAL HISTORY:  Past Surgical History:   Procedure Laterality Date    OTHER SURGICAL HISTORY Left age 7    left kidney stent    OTHER SURGICAL HISTORY Left     cyst removal left wrist       CURRENT MEDICATIONS:    ondansetron (ZOFRAN ODT) 4 MG ODT tab  etonogestrel (IMPLANON/NEXPLANON) 68 MG IMPL  ipratropium - albuterol 0.5 mg/2.5 mg/3 mL (DUONEB) 0.5-2.5 (3) MG/3ML neb solution        ALLERGIES:  Allergies   Allergen Reactions    Amoxicillin     Pcn [Penicillins]     Tylenol W/Codeine [Acetaminophen-Codeine]        FAMILY HISTORY:  History reviewed. No pertinent family history.    SOCIAL HISTORY:   Social History     Socioeconomic History    Marital status: Single     Spouse name: None    Number of children: None    Years of education: None    Highest education level: None   Tobacco Use    Smoking status: Every Day    Smokeless tobacco: Never       VITALS:  BP (!) 142/86   Pulse 68   Temp 98.1  F (36.7  C) (Temporal)   Resp 18   Ht 1.651 m (5' 5\")   Wt 61.8 kg (136 lb 3.9 oz)   SpO2 99%   BMI 22.67 kg/m      PHYSICAL EXAM    Physical Exam  Vitals and nursing note reviewed.   Constitutional:       General: She is not in acute distress.     Appearance: Normal appearance. She is normal weight. She is not ill-appearing.   HENT:      Head: Normocephalic and atraumatic.      Nose: Nose normal.      Mouth/Throat:      Mouth: Mucous membranes are dry.      Pharynx: Oropharynx is clear.   Eyes:      Extraocular Movements: Extraocular movements intact.      Conjunctiva/sclera: Conjunctivae normal.      Pupils: Pupils are equal, round, and reactive to light. "   Cardiovascular:      Rate and Rhythm: Normal rate and regular rhythm.      Pulses: Normal pulses.      Heart sounds: Normal heart sounds. No murmur heard.  Pulmonary:      Effort: Pulmonary effort is normal. No respiratory distress.      Breath sounds: Normal breath sounds.   Abdominal:      General: Abdomen is flat. There is no distension.      Palpations: Abdomen is soft.      Tenderness: There is abdominal tenderness (epigastric and left sided). There is no right CVA tenderness, left CVA tenderness, guarding or rebound.   Musculoskeletal:         General: Normal range of motion.      Cervical back: Normal range of motion.      Right lower leg: No edema.      Left lower leg: No edema.   Skin:     General: Skin is warm and dry.      Capillary Refill: Capillary refill takes less than 2 seconds.   Neurological:      General: No focal deficit present.      Mental Status: She is alert and oriented to person, place, and time. Mental status is at baseline.   Psychiatric:      Comments: Behavior normal, mood is sad and tearful, however appropriate thought content and good insight into her illness and plan of care.  Patient notes suicidality but without intent and without a plan.  She does not own guns.  Has supportive family, and her mother who is at bedside.            LAB:  All pertinent labs reviewed and interpreted.  Results for orders placed or performed during the hospital encounter of 09/16/23   Extra Blue Top Tube   Result Value Ref Range    Hold Specimen JIC    Extra Red Top Tube   Result Value Ref Range    Hold Specimen JIC    Extra Green Top (Lithium Heparin) Tube   Result Value Ref Range    Hold Specimen JIC    Extra Purple Top Tube   Result Value Ref Range    Hold Specimen JIC    Basic metabolic panel   Result Value Ref Range    Sodium 139 136 - 145 mmol/L    Potassium 3.4 3.4 - 5.3 mmol/L    Chloride 97 (L) 98 - 107 mmol/L    Carbon Dioxide (CO2) 15 (L) 22 - 29 mmol/L    Anion Gap 27 (H) 7 - 15 mmol/L     Urea Nitrogen 9.6 6.0 - 20.0 mg/dL    Creatinine 0.57 0.51 - 0.95 mg/dL    Calcium 9.6 8.6 - 10.0 mg/dL    Glucose 57 (L) 70 - 99 mg/dL    GFR Estimate >90 >60 mL/min/1.73m2   Hepatic function panel   Result Value Ref Range    Protein Total 8.2 6.4 - 8.3 g/dL    Albumin 5.3 (H) 3.5 - 5.2 g/dL    Bilirubin Total 0.5 <=1.2 mg/dL    Alkaline Phosphatase 61 35 - 104 U/L     (H) 0 - 45 U/L    ALT 49 0 - 50 U/L    Bilirubin Direct <0.20 0.00 - 0.30 mg/dL   Result Value Ref Range    Lipase 36 13 - 60 U/L   UA with Microscopic reflex to Culture    Specimen: Urine, Clean Catch   Result Value Ref Range    Color Urine Light Yellow Colorless, Straw, Light Yellow, Yellow    Appearance Urine Clear Clear    Glucose Urine Negative Negative mg/dL    Bilirubin Urine Negative Negative    Ketones Urine >150 (A) Negative mg/dL    Specific Gravity Urine 1.026 1.001 - 1.030    Blood Urine Negative Negative    pH Urine 5.5 5.0 - 7.0    Protein Albumin Urine 100 (A) Negative mg/dL    Urobilinogen Urine <2.0 <2.0 mg/dL    Nitrite Urine Negative Negative    Leukocyte Esterase Urine 75 Kelly/uL (A) Negative    Mucus Urine Present (A) None Seen /LPF    RBC Urine 1 <=2 /HPF    WBC Urine 7 (H) <=5 /HPF    Squamous Epithelials Urine 2 (H) <=1 /HPF   HCG qualitative urine (UPT)   Result Value Ref Range    hCG Urine Qualitative Negative Negative   CBC with platelets and differential   Result Value Ref Range    WBC Count 6.1 4.0 - 11.0 10e3/uL    RBC Count 3.84 3.80 - 5.20 10e6/uL    Hemoglobin 12.9 11.7 - 15.7 g/dL    Hematocrit 38.7 35.0 - 47.0 %     (H) 78 - 100 fL    MCH 33.6 (H) 26.5 - 33.0 pg    MCHC 33.3 31.5 - 36.5 g/dL    RDW 12.1 10.0 - 15.0 %    Platelet Count 217 150 - 450 10e3/uL    % Neutrophils 71 %    % Lymphocytes 20 %    % Monocytes 8 %    % Eosinophils 0 %    % Basophils 1 %    % Immature Granulocytes 0 %    NRBCs per 100 WBC 0 <1 /100    Absolute Neutrophils 4.3 1.6 - 8.3 10e3/uL    Absolute Lymphocytes 1.2 0.8 - 5.3  10e3/uL    Absolute Monocytes 0.5 0.0 - 1.3 10e3/uL    Absolute Eosinophils 0.0 0.0 - 0.7 10e3/uL    Absolute Basophils 0.0 0.0 - 0.2 10e3/uL    Absolute Immature Granulocytes 0.0 <=0.4 10e3/uL    Absolute NRBCs 0.0 10e3/uL       RADIOLOGY:  Reviewed all pertinent imaging. Please see official radiology report.  CT Abdomen Pelvis w Contrast    (Results Pending)       PROCEDURES:   None.      [employer] Documentation:   CMS Diagnoses:      Mental Health Risk Assessment        PSS-3      Date and Time Over the past 2 weeks have you felt down, depressed, or hopeless? Over the past 2 weeks have you had thoughts of killing yourself? Have you ever attempted to kill yourself? When did this last happen? User   09/16/23 1003 yes yes no -- MPH          C-SSRS ([place])      Date and Time Q1 Wished to be Dead (Past Month) Q2 Suicidal Thoughts (Past Month) Q3 Suicidal Thought Method Q4 Suicidal Intent without Specific Plan Q5 Suicide Intent with Specific Plan Q6 Suicide Behavior (Lifetime) Within the Past 3 Months? RETIRED: Level of Risk per Screen Screening Not Complete User   09/16/23 1003 yes yes no no no yes -- -- -- MPH                Item Assessment   Suicidal Ideation Suicidal thoughts   Plan None   Intent None   Suicidal or self-harm behaviors None   Risk Factors Not receiving treatment, Hopelessness, and Chronic physical pain or other acute medical problem   Protective Factors Identified reasons for living and family       I, Robbie Méndez, am serving as a scribe to document services personally performed by Chandra Pierce MD, based on my observation and the provider's statements to me. I, Chandra Pierce MD, attest that Robbie Méndez is acting in a scribe capacity, has observed my performance of the services and has documented them in accordance with my direction.      Chandra Pierce MD  [facility] EMERGENCY DEPARTMENT  [address]  [id number]  [phone]       Chandra Pierce MD  09/16/23 1442

## 2023-09-16 NOTE — ED TRIAGE NOTES
"She started to have nausea months ago. She states having \"kidney issues.\" She has not been able to eat on and off due to nausea.        "

## 2023-11-27 ENCOUNTER — PATIENT OUTREACH (OUTPATIENT)
Dept: CARE COORDINATION | Facility: CLINIC | Age: 28
End: 2023-11-27
Payer: COMMERCIAL

## 2023-11-27 NOTE — PROGRESS NOTES
Clinic Care Coordination Contact  [facility]/Voicemail    Clinical Data: Care Coordinator Outreach    Outreach Documentation Number of Outreach Attempt   11/27/2023   1:31 PM 2       Attempted to leave a message on patient's voicemail with call back information and requested return call.    Plan:  Care Coordinator will try to reach patient again in 1 month.    Shell Holguin [facility]  Social Work Care Coordinator - [facility]  Care Coordination  [email or web address]  Cell Phone: [phone]  Gender pronouns: she/her  Employed by [employer]

## 2023-11-27 NOTE — LETTER
[facility]   Patient Centered Plan of Care  About Me:        Patient Name:  Joe James    YOB: 1995  Age:         28 year old   David MRN:    [id number] Telephone Information:  Home Phone [phone]   Mobile [phone]   Home Phone [phone]       Address:  [address]  [place] [address] Email address:  No e-mail address on record      Emergency Contact(s)    Name Relationship Lgl Grd Work Phone Home Phone Mobile Phone   1. MIKE BELL Significant ot*    [phone]   2. ANGELA JAMES Mother   [phone]            Primary language:  English     needed? No   [facility] [employer]:  [phone] op. 1  Other communication barriers:None    Preferred Method of Communication:  Do Not Contact  Current living arrangement: I live in a private home with family    Mobility Status/ Medical Equipment: Independent        Health Maintenance  Health Maintenance Reviewed: Not assessed      My Access Plan  Medical Emergency 911   Primary Clinic Line [facility] [place] - [phone]   24 Hour Appointment Line [phone] or  [phone]) (toll-free)   24 Hour Nurse Line [phone] (toll-free)   Preferred Urgent Care Other     Preferred Hospital Other     Preferred Pharmacy [id number] [place] [address] AT [address]     Behavioral Health Crisis Line The National Suicide Prevention Lifeline at 1-596.630.7022 or Text/Call 668           My Care Team Members  Patient Care Team         Relationship Specialty Notifications Start End    Anny Juares MD PCP - General Family Medicine  8/21/23     Phone: [phone] Fax: [phone]         [address] [place] [address]    Shell Holguin LGSW Lead Care Coordinator  Admissions 7/17/23                 My Care Plans  Self Management and Treatment Plan    Care Plan  Care Plan: Mental Health SW Only       Problem:  Mental Health Symptoms Need Improvement       Goal: Improve management of mental health symptoms and establish with mental health/psychosocial supports       Start Date: 7/17/2023 Expected End Date: 11/17/2023    Note:     Barriers: availability and eligibility   Strengths: strong family support   Patient expressed understanding of goal: yes   Action steps to achieve this goal:  1. I will review the therapists and mental health supports shared with me via text from [facility].   2. I will connect with a therapist/provider, establish services and follow up on recommendations.   3. I will contact the  CC with any questions or concerns.                                 Action Plans on File:                       Advance Care Plans/Directives:   Advanced Care Plan/Directives on file:   No    Discussed with patient/caregiver(s): No data recorded           My Medical and Care Information  Problem List   Patient Active Problem List   Diagnosis    Normal spontaneous vaginal delivery      Current Medications and Allergies:  See printed Medication Report.    Care Coordination Start Date: 7/17/2023   Frequency of Care Coordination: monthly, more frequently as needed     Form Last Updated: 11/27/2023

## 2023-12-11 ENCOUNTER — PATIENT OUTREACH (OUTPATIENT)
Dept: CARE COORDINATION | Facility: CLINIC | Age: 28
End: 2023-12-11
Payer: COMMERCIAL

## 2023-12-11 NOTE — PROGRESS NOTES
Clinic Care Coordination Contact  [facility]/Voicemail    Clinical Data: Care Coordinator Outreach    Outreach Documentation Number of Outreach Attempt   11/27/2023   1:31 PM 2   12/11/2023   1:53 PM 2       Left message on patient's voicemail with call back information and requested return call. Sent message via text to the pt.    Plan: Care Coordinator will try to reach patient again in 10 business days.    Shell Holguin [facility]  Social Work Care Coordinator - [facility]  Care Coordination  [email or web address]  Cell Phone: [phone]  Gender pronouns: she/her  Employed by [employer]

## 2024-01-04 ENCOUNTER — PATIENT OUTREACH (OUTPATIENT)
Dept: CARE COORDINATION | Facility: CLINIC | Age: 29
End: 2024-01-04

## 2024-02-06 ENCOUNTER — PATIENT OUTREACH (OUTPATIENT)
Dept: CARE COORDINATION | Facility: CLINIC | Age: 29
End: 2024-02-06
Payer: COMMERCIAL

## 2024-02-06 NOTE — PROGRESS NOTES
Clinic Care Coordination Contact  [facility]/Voicemail    Clinical Data: Care Coordinator Outreach    Outreach Documentation Number of Outreach Attempt   11/27/2023   1:31 PM 2   12/11/2023   1:53 PM 2   2/6/2024  11:34 AM 4       Left message on patient's voicemail with call back information and requested return call. Also sent the patient a message via text as she wont answer her phone per her family.     Plan:  Care Coordinator will try to reach patient again in 1 month.    Shell Holguin [facility]  Social Work Care Coordinator - [facility]  Care Coordination  [email or web address]  Cell Phone: [phone]  Gender pronouns: she/her  Employed by [employer] [facility]

## 2024-02-06 NOTE — PROGRESS NOTES
Clinic Care Coordination Contact  [facility]/Voicemail    Clinical Data: Care Coordinator Outreach    Outreach Documentation Number of Outreach Attempt   11/27/2023   1:31 PM 2   12/11/2023   1:53 PM 2   2/6/2024  11:34 AM 4       Attempted to leave a message on patient's voicemail with call back information and requested return call, but she has no VM box.     Plan: Care Coordinator will do no further outreaches at this time.    Shell Holguin [facility]  Social Work Care Coordinator - [facility]  Care Coordination  [email or web address]  Cell Phone: [phone]  Gender pronouns: she/her  Employed by [employer] [employer]

## 2024-02-19 ENCOUNTER — TRANSFERRED RECORDS (OUTPATIENT)
Dept: HEALTH INFORMATION MANAGEMENT | Facility: CLINIC | Age: 29
End: 2024-02-19
Payer: COMMERCIAL

## 2024-02-20 ENCOUNTER — TRANSFERRED RECORDS (OUTPATIENT)
Dept: HEALTH INFORMATION MANAGEMENT | Facility: CLINIC | Age: 29
End: 2024-02-20
Payer: COMMERCIAL

## 2024-02-20 ENCOUNTER — TELEPHONE (OUTPATIENT)
Dept: BEHAVIORAL HEALTH | Facility: CLINIC | Age: 29
End: 2024-02-20

## 2024-02-20 NOTE — TELEPHONE ENCOUNTER
Writer received updated assessment recommending higher level of care and residential. Writer faxed assessment to [facility] to scan into the chart.

## 2024-02-20 NOTE — TELEPHONE ENCOUNTER
Writer received faxed DENNIS assessment update from [facility] [phone]. [facility].     Pt is recommended to a 2.1 LOC and dimensions do not reflect residential. Writer contacted Ray at [facility] and relayed this information.

## 2024-07-13 ENCOUNTER — HEALTH MAINTENANCE LETTER (OUTPATIENT)
Age: 29
End: 2024-07-13

## 2025-07-19 ENCOUNTER — HEALTH MAINTENANCE LETTER (OUTPATIENT)
Age: 30
End: 2025-07-19